# Patient Record
(demographics unavailable — no encounter records)

---

## 2024-12-12 NOTE — DISCUSSION/SUMMARY
[de-identified] : The patient's pain and symptoms have improved.  MRI was discussed in great details and all questions answered about the MRI findings.  Patient can now advance out of the boot and an ASO brace was provided to the patient for comfort and support.  Continue with RICE therapy.  Continue with over-the-counter NSAIDs and Tylenol.  Outpatient physical therapy prescription given for a strength and conditioning program following ankle sprain protocol.  Since the patient's pain is improving, the patient can follow-up in 6 to 8 weeks as needed.  No additional imaging is warranted.  No invasive treatments are warranted.  All of her questions were answered.  Follow-up as needed.  She understood and agreed to the treatment course.

## 2024-12-12 NOTE — PHYSICAL EXAM
[de-identified] : Right ankle Physical Examination:  General: Alert and oriented x3.  In no acute distress.  Pleasant in nature with a normal affect.  No apparent respiratory distress.  Erythema, Warmth, Rubor: Negative Swelling: +, improved.   ROM: With pain and stiffness from the injury. 1. Dorsiflexion: 0 degrees 2. Plantarflexion: 30 degrees 3. Inversion: 20 degrees 4. Eversion: 10 degrees 5. Subtalar: 10 degrees  Tenderness to Palpation:  1. Lateral Malleolus: Negative 2. Medial Malleolus: Negative 3. Proximal Fibular Pain: Negative 4. Heel Pain: Negative 5. Cuboid: Negative 6. Navicular: Negative 7. Tibiotalar Joint: +, improved.  8. Subtalar Joint: Negative 9. Posterior Recess: Negative  Tendon Pain: 1. Achilles: Negative 2. Peroneals: Negative 3. Posterior Tibialis: Negative 4. Tibialis Anterior: Negative  Ligament Pain: 1. ATFL: +, improved.  2. CFL: Negative  3. PTFL: Negative 4. Deltoid Ligaments: +, improved.  5. Lis Franc Ligament: Negative  Stability:  1. Anterior Drawer: Negative 2. Posterior Drawer: Negative  Strength: 5/5 TA/GS/EHL  Pulses: 2+ DP/PT Pulses  Neuro: Intact motor and sensory  Additional Test: 1. Calcaneal Squeeze Test: Negative 2. Syndesmosis Squeeze Test: Negative [de-identified] : Radiology imaging reviewed in office with the patient on 12/12/2024 and I agree with the radiologist's impression below.	  EXAM: [id number] - MR ANKLE RT  - ORDERED BY: GISELLA CERVANTES   PROCEDURE DATE:  12/10/2024    INTERPRETATION:  CLINICAL INFORMATION: Right ankle pain status post fall.  COMPARISON: Right ankle radiographs 11/21/24.  CONTRAST: IV Contrast: NONE  TECHNIQUE: MRI of the RIGHT ANKLE was performed.  FINDINGS:  OSSEOUS STRUCTURES: Acute avulsion fracture of the superior anterior talar head. Likely nondisplaced avulsion fracture at the anterior superior calcaneus. Focal inferior posterior lateral cuboid marrow edema.  JOINTS: Subchondral cystic change at the anterior medial inferior cuboid. Small tibiotalar and subtalar effusion.  MUSCLES AND TENDONS: Mild Achilles tendinosis with mild paratenonitis. Longitudinal split tear of the retromalleolar peroneus brevis tendon with distal reconstitution on a background of mild/moderate tendinosis. Circumferential fluid within the posterior tibial tendon sheath with surrounding subcutaneous edema. The medial flexor, extensor, peroneal longus tendons are intact. Mild to moderate edema within the proximal extensor digitorum brevis muscle.  LIGAMENTS: Dorsal talonavicular ligament is not well-visualized. Attenuated anterior talofibular and calcaneofibular ligament with surrounding subcutaneous edema The anterior and posterior tibiofibular, posterior talofibular, deltoid and spring ligaments are intact.  SINUS TARSI: Normal.  PLANTAR FASCIA: Thickened central cord of plantar fascia with mild perifascial edema.  NERVES: Visualized nerves are preserved.  SUBCUTANEOUS TISSUES: Normal.  IMPRESSION:  1.  Acute Chopart joint injury with dorsal talar avulsion fracture and associated grade 3 sprain of the dorsal talonavicular ligament, nondisplaced avulsion fracture at the anterior superior calcaneus with associated grade 2 extensor digitorum brevis strain, and bone contusion at the inferior posterior lateral cuboid. 2.  Grade 2 sprain of the anterior talofibular and calcaneofibular ligament.  --- End of Report ---      ROSARIO SHER MD; Fellow Radiology This document has been electronically signed. DIANE WILHELM MD; Attending Radiologist This document has been electronically signed. Dec 11 2024  9:57AM

## 2024-12-12 NOTE — HISTORY OF PRESENT ILLNESS
[FreeTextEntry1] : 12/12/2024: The patient is a 57-year-old female who presents for a follow-up right ankle and to review the MRI of her right ankle.  Patient is wearing the short cam boot and pain and swelling has improved since her previous office visit.  Patient is not using a assistive device today.  No other complaints.   11/21/2024: The patient is a 57-year-old female who presents with right ankle pain which she sustained this morning after she fell down a stair on the [employer] platform.  The patient is walking with a limp and states that her pain with walking is a 7 out of 10.  She is having pain in the front portion of her ankle with swelling.  She denies locking, catching, giving out of the right ankle.  No other complaints.

## 2025-01-23 NOTE — HISTORY OF PRESENT ILLNESS
[FreeTextEntry1] :  01/23/2025: TRUDI GILLIGAN is a 58 year old female presenting to the office for a follow up evaluation of her right ankle pain. She reports that her symptoms have improved since her last visit to the office.  The patient presents to the office in sneakers and ambulating with an ASO brace for assistance.  12/12/2024: The patient is a 57-year-old female who presents for a follow-up right ankle and to review the MRI of her right ankle.  Patient is wearing the short cam boot and pain and swelling has improved since her previous office visit.  Patient is not using a assistive device today.  No other complaints.   11/21/2024: The patient is a 57-year-old female who presents with right ankle pain which she sustained this morning after she fell down a stair on the [employer] platform.  The patient is walking with a limp and states that her pain with walking is a 7 out of 10.  She is having pain in the front portion of her ankle with swelling.  She denies locking, catching, giving out of the right ankle.  No other complaints.

## 2025-01-23 NOTE — ADDENDUM
[FreeTextEntry1] : I, Asif Arevalo, acted solely as a scribe for Dr. Asif Sr on this date 01/23/2025  .   All medical record entries made by the Scribe were at my, Dr. Asif Sr, direction and personally dictated by me on 01/23/2025 . I have reviewed the chart and agree that the record accurately reflects my personal performance of the history, physical exam, assessment and plan. I have also personally directed, reviewed, and agreed with the chart.

## 2025-01-23 NOTE — DISCUSSION/SUMMARY
[de-identified] : The patient's pain and symptoms have improved. Patient can transition out of the ASO brace and into a copper fit neoprene ankle sleeve. Continue with RICE therapy.  Continue with over-the-counter NSAIDs and Tylenol.  I recommend the patient undergo a course of physical therapy for the right ankle 2-3 times a week for a total of 8-12 weeks. A new prescription was given for the physical therapy today. Since the patient's pain is improving, the patient can follow-up as needed.  No additional imaging is warranted.  No invasive treatments are warranted.  All of her questions were answered.  Follow-up as needed.  She understood and agreed to the treatment course.